# Patient Record
(demographics unavailable — no encounter records)

---

## 2025-05-13 NOTE — PHYSICAL EXAM
[Alert] : alert [No Acute Distress] : no acute distress [Normal Sclera/Conjunctiva] : normal sclera/conjunctiva [No Respiratory Distress] : no respiratory distress [Normal Rate and Effort] : normal respiratory rate and effort [Normal Gait] : normal gait [Oriented x3] : oriented to person, place, and time [Normal Affect] : the affect was normal [Normal Insight/Judgement] : insight and judgment were intact [Normal Mood] : the mood was normal

## 2025-05-14 NOTE — ASSESSMENT
[FreeTextEntry1] : 1. T2D A1C goal <7% A1C - 11.6% (5/9/25) from 9.8% (3/11/24) from 7.8% (2/27/2023) from 6.7% (10/17/22 on POCT) from 8% (7/2022) from 8.6% (5/2022) Current regimen: Metformin 1000mg BID, glipizide 5mg daily H/o ozempic with intolerance Glucometer readings at home reveal N/a, not currently using Freestyle Renea sensors Glucometer reading performed in office today is above goal in fasting state  Chrissie has experienced glycemic deterioration since stopping ozempic regimen.  She endorses that nausea returned so stopped since last visit.  She is open to trial of alternaitve once weekly agent and will start mounjaro.  Recommend scheduling RD visit and close FU. -- start mounjaro 2.5mg/weekly, after 4 weeks to increase to 5mg/weekly -- continue metformin and glipizide at current doses -- resume SBGM, refills of Renea sent, reviewed MCR coverage guidelines  2. Hypothyroidism Current thyroid regimen: Synthroid (brand) 112mcg QAM 10/2022 TSH 0.41 on above regimen 1/28/23 TSH 0.25 5/9/25 TSH 2.07, FT4 1.7-- euthyroid on above reigmen -- continue current regimen  3. fatigue, abnormal AM cortisol 5/9/25 ACTH low normal at 8.4 with AM cesar of 2.8. CMP with normal electrolytes On Nucynta (opioid), last dosed ~4H ago Reviewed impact of insomnia and opioids on cortisol levels.  Open to ACTH stim test today -- proceed with ACTH stimulation testing today. Baseline labs obtained Following baseline labs, 0.25mg of Cosyntropin (NDC 2467-1099-37, LOT: [id number], EXP: 5/2026) administered IM to right deltoid by myself at 12:03 30 minute cortisol to be obtained at 12:33 60 minute cortisol to be obtained at 1:03 -- of note, was noticed with 30min lab that baseline cesar was not drawn.  This was performed <1 week ago, will still proceed with testing to see cortisol response.  Labs today, I will call with results Recommend re-establishing with PCP as well Follow-up with attending endocrinologist in 2 months  Rhea Echevarria MS, FNP-BC, [facility] 05/13/2025

## 2025-05-14 NOTE — ASSESSMENT
[FreeTextEntry1] : 1. T2D A1C goal <7% A1C - 11.6% (5/9/25) from 9.8% (3/11/24) from 7.8% (2/27/2023) from 6.7% (10/17/22 on POCT) from 8% (7/2022) from 8.6% (5/2022) Current regimen: Metformin 1000mg BID, glipizide 5mg daily H/o ozempic with intolerance Glucometer readings at home reveal N/a, not currently using Freestyle Renea sensors Glucometer reading performed in office today is above goal in fasting state  Chrissie has experienced glycemic deterioration since stopping ozempic regimen.  She endorses that nausea returned so stopped since last visit.  She is open to trial of alternaitve once weekly agent and will start mounjaro.  Recommend scheduling RD visit and close FU. -- start mounjaro 2.5mg/weekly, after 4 weeks to increase to 5mg/weekly -- continue metformin and glipizide at current doses -- resume SBGM, refills of Renea sent, reviewed MCR coverage guidelines  2. Hypothyroidism Current thyroid regimen: Synthroid (brand) 112mcg QAM 10/2022 TSH 0.41 on above regimen 1/28/23 TSH 0.25 5/9/25 TSH 2.07, FT4 1.7-- euthyroid on above reigmen -- continue current regimen  3. fatigue, abnormal AM cortisol 5/9/25 ACTH low normal at 8.4 with AM cesar of 2.8. CMP with normal electrolytes On Nucynta (opioid), last dosed ~4H ago Reviewed impact of insomnia and opioids on cortisol levels.  Open to ACTH stim test today -- proceed with ACTH stimulation testing today. Baseline labs obtained Following baseline labs, 0.25mg of Cosyntropin (NDC 6415-9694-10, LOT: [id number], EXP: 5/2026) administered IM to right deltoid by myself at 12:03 30 minute cortisol to be obtained at 12:33 60 minute cortisol to be obtained at 1:03 -- of note, was noticed with 30min lab that baseline cesar was not drawn.  This was performed <1 week ago, will still proceed with testing to see cortisol response.  Labs today, I will call with results Recommend re-establishing with PCP as well Follow-up with attending endocrinologist in 2 months  Rhea Echevarria MS, FNP-BC, [facility] 05/13/2025

## 2025-05-14 NOTE — ASSESSMENT
[FreeTextEntry1] : 1. T2D A1C goal <7% A1C - 11.6% (5/9/25) from 9.8% (3/11/24) from 7.8% (2/27/2023) from 6.7% (10/17/22 on POCT) from 8% (7/2022) from 8.6% (5/2022) Current regimen: Metformin 1000mg BID, glipizide 5mg daily H/o ozempic with intolerance Glucometer readings at home reveal N/a, not currently using Freestyle Renea sensors Glucometer reading performed in office today is above goal in fasting state  Chrissie has experienced glycemic deterioration since stopping ozempic regimen.  She endorses that nausea returned so stopped since last visit.  She is open to trial of alternaitve once weekly agent and will start mounjaro.  Recommend scheduling RD visit and close FU. -- start mounjaro 2.5mg/weekly, after 4 weeks to increase to 5mg/weekly -- continue metformin and glipizide at current doses -- resume SBGM, refills of Renea sent, reviewed MCR coverage guidelines  2. Hypothyroidism Current thyroid regimen: Synthroid (brand) 112mcg QAM 10/2022 TSH 0.41 on above regimen 1/28/23 TSH 0.25 5/9/25 TSH 2.07, FT4 1.7-- euthyroid on above reigmen -- continue current regimen  3. fatigue, abnormal AM cortisol 5/9/25 ACTH low normal at 8.4 with AM cesar of 2.8. CMP with normal electrolytes On Nucynta (opioid), last dosed ~4H ago Reviewed impact of insomnia and opioids on cortisol levels.  Open to ACTH stim test today -- proceed with ACTH stimulation testing today. Baseline labs obtained Following baseline labs, 0.25mg of Cosyntropin (NDC 9463-6316-36, LOT: [id number], EXP: 5/2026) administered IM to right deltoid by myself at 12:03 30 minute cortisol to be obtained at 12:33 60 minute cortisol to be obtained at 1:03 -- of note, was noticed with 30min lab that baseline cesar was not drawn.  This was performed <1 week ago, will still proceed with testing to see cortisol response.  Labs today, I will call with results Recommend re-establishing with PCP as well Follow-up with attending endocrinologist in 2 months  Rhea Echevarria MS, FNP-BC, [facility] 05/13/2025

## 2025-05-14 NOTE — ADDENDUM
[FreeTextEntry1] : 05/14/2025, addendum, SG ACTH stimulation test with appropriate rise in cortisol >18, ruling out adrenal insufficiency and low AM cesar more likely r/t insomnia and disrupted sleeping patterns

## 2025-05-14 NOTE — REVIEW OF SYSTEMS
[As Noted in HPI] : as noted in HPI [Fatigue] : fatigue [Joint Pain] : joint pain [Insomnia] : insomnia [Anxiety] : anxiety [Stress] : stress [Negative] : Endocrine [Polyuria] : no polyuria

## 2025-05-14 NOTE — HISTORY OF PRESENT ILLNESS
[FreeTextEntry1] : BLANCA AGUERO is a 66 year female with pmhx of T2D, hypothyroidism, multiple sclerosis who presents for T2D follow-up.  Patient of Dr. Shah, last visit, 3/31/2022 Last visit with myself 03/11/2024  Presents with Daughter on phone today  Interval change: Lost to follow-up over the past year Labs from 5/9/25 reviewed with patient, as below S/p retrial of ozempic with GI intolerance, so discontinued. Continues metformin and glipizide, as below Open to alternative once weekly agent to assist with glycemic improvement Endorses feeling variability of BG without BG monitoring, likely r/t no daily schedule and eating patterns on PEREZ Endorses hair loss, "eating badly" not enough protein. Chronic pain in hand, which limits meal prep/self cooking-- on medication, Nucynta, as below +insomnia, endorses days and nights are "messed up" for past 4-5 years, including around lab draw +fatigue, when tired will sleep dueing day and then this disrupts evening sleep Desires proceeding with ACTH Stimulation testing Barrier to care is mood-- specifically anxiety at night, which worsens insomnia Desires establishing with new PCP  A1C - 11.6% (5/9/25) from 9.8% (3/11/24) from 7.8% (2/27/2023) from 6.7% (10/17/22 on POCT) from 8% (7/2022) from 8.6% (5/2022) Office Fingerstick -- 250 (2H postprandial, coffee and cream)  Current medication: -- Metformin 1000mg BID -- glipizide 5mg daily Nucynta 75mg Q6-8H Lyrica 150mg Q8H Synthroid 112mcg QAM Rosuvastatin 10mg daily vitD  Past medication: - Victoza - Trulicity -- Ozempic 2mg/weekly,   BLANCA reports they take their diabetes medication MOST of the time. BLANCA denies hypoglycemia symptoms or BG <70  Diabetes Self-Management: Glucometer: Maxwell Meier Needs sensors, no recent SBGM  2. Hypothyroidism dx >20 years ago Current regimen: Synthroid LT4 112 QAM

## 2025-06-09 NOTE — END OF VISIT
[FreeTextEntry3] : Patient was seen and discussed with justin cantrell Note was edited and amended as necessary Patient was physically seen at [address]  [Time Spent: ___ minutes] : I have spent [unfilled] minutes of time on the encounter which excludes teaching and separately reported services.

## 2025-06-09 NOTE — HISTORY OF PRESENT ILLNESS
[FreeTextEntry1] : HPI- 66F, former pt of Dr Duggan, PMHx class I obesity, HLD, DM (A1c 11.6%, May 2025), hypothyroidism, MS, complex regional pain syndrome presents for hemorrhoids.   Patient reports hemorrhoids. She had gastroenteritis and was having frequent BMs, felt that it had increased in size. Denies rectal pain, itching, rectal bleeding. Having 1 BM most days, consistency varies. Will be starting Mounjaro tomorrow. Also taking glipizide and metformin.  Denies abd pain, nausea, vomiting, unintentional weight loss, dysphagia, heartburn. Denies straining.   HPI: Previously seen by Dr Duggan for evaluation of sensitive stomach, diarrhea and screening colonoscopy. At the time was experiencing diarrhea, once a week [place]ulk[place] 6-7, no blood. Underwent workup as noted below, unrevealing as part of diarrhea evaluation. Was supposed to schedule herself for an EGD/colonoscopy however patient admits to not scheduling due to life stressors.   States her BMs are better than when she saw Dr Duggan however about 2 weeks ago had a bout of diarrhea, which resolved on its own. States this occurs mainly in the setting of eating poorly. Denies any fevers, chills, abdominal pain, unintentional wt loss, non-bloody stools.  States she has to schedule a follow up appt with endocrinology soon. On Ozempic 0.5 mg weekly, glipizide, metformin BID. She currently does not check her FS at home. She states that she feels like her sugars are under better control though as she's not having as much urinary issues.   Lives by herself. Lost  9 yrs ago.  Remainder of ROS negative.   7Ac4 - 62.1 (4/12/21) Gliadin ab negative (4/12/21) TTG IgA/IgG ab negative (4/12/21) IgA 124 normal CRP 6 (4/12/21) TSH 4.56 (4/12/21)  Hgb 14.2 (3/27/24)  Serum glucose 310 (3/27/24)  TB 0.5 (3/27/24) Alk phos 119 (3/27/24) AST 27 (3/27/24) ALT 32 (3/27/24) BMI 31.37 (171 lb) as of 5/8/24  Referred by - Dr James  PMHx - class I obesity, HLD, DM (A1c 9.8%, March 2024), hypothyroidism, MS, complex regional pain syndrome PSHx - CCY, 2 C sections, one hysterectomy Rx - Synthroid, Ozempic 0.5 mg weekly, glipizide 5 mg daily, Metformin 1g BID, crestor, lyrica, duloxetine  Supplements/herbs/OTC - denies A/C or NSAIDs? - denies FMHx - no family history of CRC or GI related malignancy; no IBD Allergies - sulfa intolerance? EtOH - social etoh  Smoking - never smoker Drugs - denies  EGD - no prior Colonoscopy - 40 yrs ago to r/o inflammation as part of workup for iritis, no polyps noted then (no report to review at time of today's visit)

## 2025-06-09 NOTE — ASSESSMENT
[FreeTextEntry1] : HPI- 66F, former pt of Dr Duggan, PMHx class I obesity, HLD, DM (A1c 11.6%, May 2025), hypothyroidism, MS, complex regional pain syndrome presents for hemorrhoids.  #Uncontrolled Diabetes  #Hemorrhoids #CRC screening Patient with history of uncontrolled DM, A1c 11.6%, May 2025 -Encouraged follow up with endocrinology for optimization of diabetes prior to scheduling for a procedure, will be starting Mounjaro soon - would recommend Colonoscopy at [facility] with Suprep, will defer on scheduling at this time pending optimization of sugars  - advise pt to avoid straining, maintain soft BMs with hydration and fiber - can use sitz baths, OTC topical prep h  - if worsening hemorrhoid discomfort (currently asymptomatic), will refer to colorectal surgery - referral to dietitian to assist with BG management and weight loss  Follow up in 6 months

## 2025-06-09 NOTE — PHYSICAL EXAM
[Alert] : alert [Normal Voice/Communication] : normal voice/communication [Healthy Appearing] : healthy appearing [No Acute Distress] : no acute distress [Hearing Threshold Finger Rub Not [place]] : hearing was normal [Sclera] : the sclera and conjunctiva were normal [Normal Appearance] : the appearance of the neck was normal [No Respiratory Distress] : no respiratory distress [No Acc Muscle Use] : no accessory muscle use [No Masses] : no abdominal mass palpated [Abdomen Tenderness] : non-tender [Abdomen Soft] : soft [] : no hepatosplenomegaly [Abnormal Walk] : normal gait [Oriented To Time, Place, And Person] : oriented to person, place, and time [Chaperone Present: ____] : chaperone present: [unfilled] [de-identified] : external and internal hemorrhoids noted, no blood

## 2025-07-30 NOTE — HISTORY OF PRESENT ILLNESS
[de-identified] : 66 yr old w MS, DM2, here for CPE  2024  65 yr old woman w MS, uncontrolled DM2 (A1C 9.8% 3/2024) here in f/u Had agreed to start Ozempic in March, and also we raised dose of rosuvastatin to 20 mg qd then. Re DM was to increase ozempic and cont glipizide and metformin w f/u w endocrinology at 3 mo; she is taking the ozempic, has had four doses, but has labs booked in the morning to include cortisol so defers lab draw today. she feels better overall. she states urinary frequency has decreased and blood sugars improved; she plans to f/u w her neurologist at [facility] re MS and wants to consider spinal stim implant.   3/26/2024  65 yr old with MS (neurologist is Dr Marv Bynum at [facility]) uncontrolled DM2 (A1C 11% 3/2024) here for annual CPE. Seeing Dr Echevarria, endocrinologist.  Discussed not taking care of herself, and need for therapy (her daughter, with Shirley (3) and Marv (9 mo) wishes she would do therapy. Given name.   Had stopped Ozempic in 2023 after she developed some N/V. Now is to restart at low dose, and has instructions, has not yet implemented; has f/u PA here.  Overdue for colo (last in 1980s she thinks) overdue for MMG  2022  63 yr old with uncontrolled DM, MS, Hashimoto's, obesity, HTN, here for physical. Consulted Dr Shah re DM has to f/u in 3 mo ; A1C went to 8%; she doesn't take the Ozempic regularly - encouraged to have daughter to remind her weekly.   2021   63 yr old with uncontrolled DM, MS, Hashimoto's, obesity, HTN here for follow up. saw GI and EGD recommended in 10/2021. She has deferred this until  schedule.  Is seeing Dr Shah regularly as well as her new neurologist who is treating her MS. Has general fatigue and requests repeat labs including cortisol.  Pain from MS "starts to cut and burn" taking lyrica TID, relieves the pain, but makes her tired. Needs Pneumovax.  2021 62 yr old with uncontrolled DM, MS, Hashimoto's, obesity, HTN here for physical. Feels well. Overjoyed her daughter Yady Rasmussen has had , Shirley, named after Chrissie's mother, who  from COVID in 2020.  Shirley born 3 weeks premature and doing well. Had second Moderna vaccine 3/29/2021.  Saw Dr Duggan and plans to get colo and EGD; note reviewed.  Has new neurologist for MS at [facility] Dr Bynum.  LAST VISIT:  2/3/2020  61 yr old here for physical. She has Hashimoto's, MS, HLD, DM2, obesity, HTN. Hasn't arranged for the tests she needs e.g. MMG, colo. Plans GI, Neuro, etc referrals.  Her daughter Assistant MECHELLE in [place], is thinking about children; she's very excited by this prospect.  LAST VISIT:  61 yr old who moved from [place] a year ago, with Hashimoto's, MS, HLD, DM2, obesity, HTN here in f/u.  Thyroid rx adjusted by Dr Shah and has 3 mo f/u w her; also CPE in 2020 w me. She has been overwhelmed getting her medical care in order, and hasn't yet scheduled the referrals provided last visit including dexa, MMG, GI consult. She did establish care with Dr Shah and her HGA1C has trended down.  She is taking a lower dose of levothyroxine as prescribed by Dr Shah. She's interested in long term in depth support of eating better.  LAST VISIT:  60 yr woman with medical issues that include Hashimoto's, MS, HLD, DM2, obesity, HTN here in f/u. She's now committed to getting healthier; is seeing nutritionist nad working with Dr Shah and her staff. Taking Trulicity and metformin. BP controlled.  Feels far less depressed since thyroid largely corrected  LAST VISIT:  60 yr old woman with medical issues that include Hashimoto's, MS, HLD, DM2, obesity, HTN here in follow up of elevated TSH and DM2. Tells me she hasn't started the MS meds prescribed last summer by her neurologist at [facility]; after thyroid issue is corrected she will start the medication.  She reports feeling more herself since taking the increased dose of syntrhoid 200 mcg qd.  Her insurance did not cover Januvia, so she resumed glipizide in addition to the metformin for her DM. HGA1C and TSH markedly elevated last visit.    LAST VISIT:  60 yr old woman with medical issues that include Hashimoto's, MS, HLD, DM2, obesity, HTN here in follow up of elevated TSH and DM2.  Thyroid US in interim unremarkable; consistent with Hashimoto's.  Depressed and not feeling well ;denies suicidal ideation. Wasn't taking thyroid med properly, reviewed directions.  LAST VISIT:  New patient, 60 yr old woman with medical issues that include Hashimoto's, MS, HLD, DM2, obesity, HTN here to establish care and have CPE.  Referred by her daughter the Asst MIN [facility], Yady Landis.  Moved to [place] in August; lost her  to kidney cancer in [place] almost exactly 4 yrs.  She brings records including labs from [place], notable for TSH over 20. Has fatigue, depression, feels lousy. Needs new pain management doc in [place].  Sees neurologist at [facility]. She's had 3-4 falls in the last few months, which she attributes to MS

## 2025-07-30 NOTE — HISTORY OF PRESENT ILLNESS
[de-identified] : 66 yr old w MS, DM2, here for CPE  2024  65 yr old woman w MS, uncontrolled DM2 (A1C 9.8% 3/2024) here in f/u Had agreed to start Ozempic in March, and also we raised dose of rosuvastatin to 20 mg qd then. Re DM was to increase ozempic and cont glipizide and metformin w f/u w endocrinology at 3 mo; she is taking the ozempic, has had four doses, but has labs booked in the morning to include cortisol so defers lab draw today. she feels better overall. she states urinary frequency has decreased and blood sugars improved; she plans to f/u w her neurologist at [facility] re MS and wants to consider spinal stim implant.   3/26/2024  65 yr old with MS (neurologist is Dr Marv Bynum at [facility]) uncontrolled DM2 (A1C 11% 3/2024) here for annual CPE. Seeing Dr Echevarria, endocrinologist.  Discussed not taking care of herself, and need for therapy (her daughter, with Shirley (3) and Marv (9 mo) wishes she would do therapy. Given name.   Had stopped Ozempic in 2023 after she developed some N/V. Now is to restart at low dose, and has instructions, has not yet implemented; has f/u PA here.  Overdue for colo (last in 1980s she thinks) overdue for MMG  2022  63 yr old with uncontrolled DM, MS, Hashimoto's, obesity, HTN, here for physical. Consulted Dr Shah re DM has to f/u in 3 mo ; A1C went to 8%; she doesn't take the Ozempic regularly - encouraged to have daughter to remind her weekly.   2021   63 yr old with uncontrolled DM, MS, Hashimoto's, obesity, HTN here for follow up. saw GI and EGD recommended in 10/2021. She has deferred this until  schedule.  Is seeing Dr Shah regularly as well as her new neurologist who is treating her MS. Has general fatigue and requests repeat labs including cortisol.  Pain from MS "starts to cut and burn" taking lyrica TID, relieves the pain, but makes her tired. Needs Pneumovax.  2021 62 yr old with uncontrolled DM, MS, Hashimoto's, obesity, HTN here for physical. Feels well. Overjoyed her daughter Yady Rasmussen has had , Shirley, named after Chrissie's mother, who  from COVID in 2020.  Shirley born 3 weeks premature and doing well. Had second Moderna vaccine 3/29/2021.  Saw Dr Duggan and plans to get colo and EGD; note reviewed.  Has new neurologist for MS at [facility] Dr Bynum.  LAST VISIT:  2/3/2020  61 yr old here for physical. She has Hashimoto's, MS, HLD, DM2, obesity, HTN. Hasn't arranged for the tests she needs e.g. MMG, colo. Plans GI, Neuro, etc referrals.  Her daughter Assistant MECHELLE in [place], is thinking about children; she's very excited by this prospect.  LAST VISIT:  61 yr old who moved from [place] a year ago, with Hashimoto's, MS, HLD, DM2, obesity, HTN here in f/u.  Thyroid rx adjusted by Dr Shah and has 3 mo f/u w her; also CPE in 2020 w me. She has been overwhelmed getting her medical care in order, and hasn't yet scheduled the referrals provided last visit including dexa, MMG, GI consult. She did establish care with Dr Shah and her HGA1C has trended down.  She is taking a lower dose of levothyroxine as prescribed by Dr Shah. She's interested in long term in depth support of eating better.  LAST VISIT:  60 yr woman with medical issues that include Hashimoto's, MS, HLD, DM2, obesity, HTN here in f/u. She's now committed to getting healthier; is seeing nutritionist nad working with Dr Shah and her staff. Taking Trulicity and metformin. BP controlled.  Feels far less depressed since thyroid largely corrected  LAST VISIT:  60 yr old woman with medical issues that include Hashimoto's, MS, HLD, DM2, obesity, HTN here in follow up of elevated TSH and DM2. Tells me she hasn't started the MS meds prescribed last summer by her neurologist at [facility]; after thyroid issue is corrected she will start the medication.  She reports feeling more herself since taking the increased dose of syntrhoid 200 mcg qd.  Her insurance did not cover Januvia, so she resumed glipizide in addition to the metformin for her DM. HGA1C and TSH markedly elevated last visit.    LAST VISIT:  60 yr old woman with medical issues that include Hashimoto's, MS, HLD, DM2, obesity, HTN here in follow up of elevated TSH and DM2.  Thyroid US in interim unremarkable; consistent with Hashimoto's.  Depressed and not feeling well ;denies suicidal ideation. Wasn't taking thyroid med properly, reviewed directions.  LAST VISIT:  New patient, 60 yr old woman with medical issues that include Hashimoto's, MS, HLD, DM2, obesity, HTN here to establish care and have CPE.  Referred by her daughter the Asst MIN [facility], Yady Landis.  Moved to [place] in August; lost her  to kidney cancer in [place] almost exactly 4 yrs.  She brings records including labs from [place], notable for TSH over 20. Has fatigue, depression, feels lousy. Needs new pain management doc in [place].  Sees neurologist at [facility]. She's had 3-4 falls in the last few months, which she attributes to MS